# Patient Record
Sex: FEMALE | Race: BLACK OR AFRICAN AMERICAN
[De-identification: names, ages, dates, MRNs, and addresses within clinical notes are randomized per-mention and may not be internally consistent; named-entity substitution may affect disease eponyms.]

---

## 2020-09-11 ENCOUNTER — HOSPITAL ENCOUNTER (EMERGENCY)
Dept: HOSPITAL 88 - ER | Age: 25
Discharge: HOME | End: 2020-09-11
Payer: MEDICAID

## 2020-09-11 VITALS — BODY MASS INDEX: 21.26 KG/M2 | HEIGHT: 63 IN | WEIGHT: 120 LBS

## 2020-09-11 VITALS — DIASTOLIC BLOOD PRESSURE: 82 MMHG | SYSTOLIC BLOOD PRESSURE: 131 MMHG

## 2020-09-11 DIAGNOSIS — M25.471: ICD-10-CM

## 2020-09-11 DIAGNOSIS — M25.571: Primary | ICD-10-CM

## 2020-09-11 DIAGNOSIS — F17.210: ICD-10-CM

## 2020-09-11 PROCEDURE — 99283 EMERGENCY DEPT VISIT LOW MDM: CPT

## 2020-09-11 NOTE — XMS REPORT
Clinical Summary

                             Created on: 2020



[facility]

External Reference #: [id number]

: 1995

Sex: Female



Demographics





                          Address                   [address]

[place]  [address]

 

                          Preferred Language        Unknown

 

                          Marital Status            Unknown

 

                          Anabaptism Affiliation     Unknown

 

                          Race                      Unknown

 

                          Ethnic Group              Unknown





Author





                          Author                    [facility]

 

                          Organization              [facility]

 

                          Address                   Unknown

 

                          Phone                     Unavailable







Care Team Providers





                    Care Team Member Name Role                Phone

 

                          PCP                       Unavailable







Allergies

No Known Allergies



Medications

Not on file



Active Problems





Not on file



Social History





                                        Date



                 Tobacco Use     Types           Packs/Day       Years Used 

 

                                         



                                         Never Assessed    







 



                           Sex Assigned at Birth     Date Recorded

 

 



                                         Not on file 







                                        Industry



                           Job Start Date            Occupation 

 

                                        Not on file



                           Not on file               Not on file 







                                        Travel End



                           Travel History            Travel Start 

 





                                         No recent travel history available.







Last Filed Vital Signs

Not on file



Plan of Treatment





   



                 Health Maintenance  Due Date        Last Done       Comments

 

   



                           CHLAMYDIA SCREENING       2012  

 

   



                           CERVICAL CANCER SCREENING  2017  

 

   



                           INFLUENZA VACCINE         10/01/2020  







Results

Not on fileafter 2019



Insurance





                                        Type



            Payer      Benefit    Subscriber ID  Effective  Phone      Address 



                           Plan /                    Dates   



                                         Group     

 

                                        Medicaid



                 MEDICAID        MEDICAID        xxxxxxxxx       [id number]   



                                         thea   







     



            Guarantor Name  Account    Relation to  Date of    Phone      Billin

g Address



                     Type                Patient             Birth  

 

     



              Puja Shin  Personal/F   Self         1995   [address]



                           [place] [address]







Advance Directives





For more information, please contact: [phone]







                          Patient Representative    Explanation



                           Type                      Date Recorded  

 

                                                     



                                         Advance Directives,   



                                         Living Will and   



                                         Medical Power of

## 2020-09-11 NOTE — DIAGNOSTIC IMAGING REPORT
X-ray 3 views of the ankle.



HISTORY:  Pain.    

COMPARISON: None available.

     

FINDINGS:

Bones/joints: No acute fracture or dislocation. Ankle mortise is symmetric



Soft tissues:No focal soft tissue abnormality.



IMPRESSION: 



No acute radiographic abnormality.



Signed by: Nadja Miles MD on 9/11/2020 8:30 PM

## 2020-09-11 NOTE — EMERGENCY DEPARTMENT NOTE
History of Present Illnes


History of Present Illness


Chief Complaint:  General Medicine Complaints


History of Present Illness


This is a 24 year old  female Chief Complaint Comment PATIENT IN

FROM HOME WITH COMPLAINTS OF RIGHT ANKLE PAIN STARTING THIS MORNING; PATIENT 

DENIES ANY TRAUMA, NO TRIP OR FALL, NO RECENT INJURY.  PATIENT RATES PAIN 10/10,

AMBULATORY WITH A LIMP.


Historian:  Patient


Arrival Mode:  Car


 Required:  No


Onset (how long ago):  day(s) (1)


Location:  R ankle


Quality:  Dull


Radiation:  Reports non-radiation


Severity:  mild


Onset quality:  sudden


Duration (how long):  day(s) (1)


Timing of current episode:  constant


Progression:  unchanged


Chronicity:  new


Context:  Denies recent illness, Denies recent surgery


Relieving factors:  none


Exacerbating factors:  none


Associated symptoms:  Reports denies other symptoms


Treatments prior to arrival:  none





Past Medical/Family History


Physician Review


I have reviewed the patient's past medical and family history.  Any updates have

been documented here.





Past Medical History


Recent Fever:  No


Clinical Suspicion of Infectio:  No


New/Unexplained Change in Ment:  No


Past Medical History:  None


Past Surgical History:  None





Social History


Smoking Cessation:  Current every day smoker


Counseling Performed:  Yes


Alcohol Use:  None


Any Illegal Drug Use:  No


Physically hurt or threatened:  No





Other


Last Tetanus:  UNK


Any Pre-Existing Lines (PICC,:  No





Review of Systems


Review of Systems


Constitutional:  Reports no symptoms


EENTM:  Reports no symptoms


Cardiovascular:  Reports no symptoms


Respiratory:  Reports no symptoms


Gastrointestinal:  Reports no symptoms


Genitourinary:  Reports no symptoms


Musculoskeletal:  Reports as per HPI, Reports joint pain (R ankle)


Integumentary:  Reports no symptoms


Neurological:  Reports no symptoms


Psychological:  Reports no symptoms


Endocrine:  Reports no symptoms


Hematological/Lymphatic:  Reports no symptoms





Physical Exam


Related Data


Allergies:  


Coded Allergies:  


     No Known Allergies (Unverified , 6/2/17)


Triage Vital Signs





Vital Signs








  Date Time  Temp Pulse Resp B/P (MAP) Pulse Ox O2 Delivery O2 Flow Rate FiO2


 


9/11/20 18:26 98.0 85 16 128/83 100 Room Air  








Vital signs reviewed:  Yes





Physical Exam


CONSTITUTIONAL





Constitutional:  Present well-developed, Present well-nourished


HENT


HENT:  Present normocephalic, Present atraumatic, Present oropharynx 

clear/moist, Present nose normal


HENT L/R:  Present left ext ear normal, Present right ext ear normal


EYES





Eyes:  Reports PERRL, Reports conjunctivae normal


NECK


Neck:  Present ROM normal


PULMONARY


Pulmonary:  Present effort normal, Present breath sounds normal


CARDIOVASCULAR





Cardiovascular:  Present regular rhythm, Present heart sounds normal, Present 

capillary refill normal, Present normal rate


GASTROINTESTINAL





Abdominal:  Present soft, Present nontender, Present bowel sounds normal


GENITOURINARY





Genitourinary:  Present exam deferred


SKIN


Skin:  Present warm, Present dry


MUSCULOSKELETAL





Musculoskeletal:  Present ROM normal, Present edema (Mild, R ankle), Present 

other (ROM full at R ankle, Able to ambulate. Neurovascularly intact)


NEUROLOGICAL





Neurological:  Present alert, Present oriented x 3, Present no gross motor or 

sensory deficits


PSYCHOLOGICAL


Psychological:  Present mood/affect normal, Present judgement normal





Results


Imaging


Imaging results reviewed:  Yes





Assessment & Plan


Medical Decision Making


MDM


24-year-old female presents with right ankle pain, atraumatic. Jaw has some mild

swelling. She denies history of STI's. Initial differential includes septic 

arthritis versus ankle sprain versus dependent edema among others. Doubt septic 

joint at this time due to patient's ability to ambulate, no erythema, no warmth.

X-rays of the ankle show no acute abnormality. Doubt emergent process at this 

time. I discussed results patient as well as expected disease time course and 

management. They will follow up with their primary care provider or return to 

the emergency department for new or worsening symptoms. Patient's appropriate 

for discharge.





Part of this note was dictated with Estrella and is subject to recognition error





Reassessment


Reassessment time:  19:48


Reassessment


Well appearing, NAD





Assessment & Plan


Final Impression:  


(1) Ankle swelling


Depart Disposition:  HOME, SELF-CARE


Last Vital Signs











  Date Time  Temp Pulse Resp B/P (MAP) Pulse Ox O2 Delivery O2 Flow Rate FiO2


 


9/11/20 18:26 98.0 85 16 128/83 100 Room Air  








Home Meds


No Active Prescriptions or Reported Meds


Medications in the ED





Ibuprofen 400 mg ONCE  ONCE PO  Last administered on 9/11/20at 19:25; Admin Dose

400 MG;  Start 9/11/20 at 18:45;  Stop 9/11/20 at 18:54;  Status DC


Acetaminophen 650 mg ONCE  ONCE PO  Last administered on 9/11/20at 19:25; Admin 

Dose 650 MG;  Start 9/11/20 at 18:45;  Stop 9/11/20 at 18:54;  Status DC











LAMONTE CRAVEN MD          Sep 11, 2020 19:48

## 2020-09-11 NOTE — XMS REPORT
Continuity of Care Document

                             Created on: 2020



RADHA PRICE

External Reference #: [id number]

: 1995

Sex: Female



Demographics





                          Address                   [address] [place]  [address]

 

                          Home Phone                [phone]

 

                          Preferred Language        Unknown

 

                          Marital Status            Unknown

 

                          Cheondoism Affiliation     Unknown

 

                          Race                      Unknown

 

                          Ethnic Group              Unknown





Author





                          Author                    [facility]

 

                          Organization              [facility]

 

                          Address                   [address]

[place]  [address]



 

                          Phone                     Unavailable







Care Team Providers





                    Care Team Member Name Role                Phone

 

                          Unavailable               Unavailable







Problems

This patient has no known problems.



Allergies, Adverse Reactions, Alerts

This patient has no known allergies or adverse reactions.



Social History





           Social Habit Start Date Stop Date  Quantity   Comments   Source

 

           Sex Assigned At Birth                                             Brittney Kessler







Medications

This patient has no known medications.



Procedures

This patient has no known procedures.



Plan of Care





             Planned Activity Planned Date Details      Comments     Source

 

                    Future Scheduled Test 2020-10-01 00:00:00 INFLUENZA VACCINE 

[code = INFLUENZA 

VACCINE]                                            Kyle Kessler

 

                    Future Scheduled Test 2017 00:00:00 Screening for barbara

gnant neoplasm of 

cervix (procedure) [code = 810100666]                           Wright Estela



 

                    Future Scheduled Test 2012 00:00:00 CHLAMYDIA SCREENIN

G [code = CHLAMYDIA 

SCREENING]                                          Wright Victoria







Results

This patient has no known results.